# Patient Record
Sex: FEMALE | Race: WHITE
[De-identification: names, ages, dates, MRNs, and addresses within clinical notes are randomized per-mention and may not be internally consistent; named-entity substitution may affect disease eponyms.]

---

## 2021-02-11 ENCOUNTER — HOSPITAL ENCOUNTER (OUTPATIENT)
Dept: HOSPITAL 11 - JP.SDS | Age: 46
Discharge: HOME | End: 2021-02-11
Attending: SURGERY
Payer: COMMERCIAL

## 2021-02-11 DIAGNOSIS — Z20.822: ICD-10-CM

## 2021-02-11 DIAGNOSIS — Z80.0: ICD-10-CM

## 2021-02-11 DIAGNOSIS — Z01.812: ICD-10-CM

## 2021-02-11 DIAGNOSIS — K63.5: ICD-10-CM

## 2021-02-11 DIAGNOSIS — F17.290: ICD-10-CM

## 2021-02-11 DIAGNOSIS — Z86.010: ICD-10-CM

## 2021-02-11 DIAGNOSIS — Z12.11: Primary | ICD-10-CM

## 2021-02-11 PROCEDURE — U0002 COVID-19 LAB TEST NON-CDC: HCPCS

## 2021-02-11 PROCEDURE — 87635 SARS-COV-2 COVID-19 AMP PRB: CPT

## 2021-02-11 PROCEDURE — 45380 COLONOSCOPY AND BIOPSY: CPT

## 2021-02-11 NOTE — OR
DATE OF PROCEDURE:  02/11/2021

 

SURGEON:  Kendall Murphy MD

 

PROCEDURE:  Colonoscopy.

 

FINDINGS:  Very small, approximately less than 5 mm polyp, completely removed using cold

biopsy forceps.

 

COMPLICATIONS:  None.

 

ASSISTANT:  None.

 

PREOPERATIVE DIAGNOSIS:  Family history of colorectal cancer.

 

POSTOPERATIVE DIAGNOSIS:  Family history of colorectal cancer.

 

RISKS:  Risks, benefits, alternatives, and limitations including, but not limited to

infection, bleeding, and perforation.  We also discussed false positives and false

negatives.

 

PROCEDURE IN DETAIL:  The patient was placed in left lateral decubitus position.  Digital

rectal exam was performed without abnormality.  Scope was introduced and advanced

atraumatically to the ileocecal valve.  A photo was taken of this.

 

Scope was brought back to the ascending, transverse, descending colon, and retroflexed.

 

In the sigmoid colon/rectum, a very small polyp was identified, completely removed using

cold biopsy forceps.

 

No abnormal bleeding was noted after removal.

 

No old or new blood.  No diverticulosis.  No colitis.  No abnormalities on retroflexion.

 

Greater than 8 minutes was spent removing the scope.  The prep was acceptable, approximately

95% of the luminal surface could be seen with some solid and liquid stool remaining.  The

patient tolerated the procedure well.

 

 

 

 

Kendall Murphy MD

DD:  02/11/2021 08:18:23

DT:  02/11/2021 08:34:25

Job #:  [id number]